# Patient Record
Sex: MALE | Race: WHITE | NOT HISPANIC OR LATINO | URBAN - METROPOLITAN AREA
[De-identification: names, ages, dates, MRNs, and addresses within clinical notes are randomized per-mention and may not be internally consistent; named-entity substitution may affect disease eponyms.]

---

## 2020-03-15 ENCOUNTER — EMERGENCY (EMERGENCY)
Facility: HOSPITAL | Age: 45
LOS: 0 days | Discharge: HOME | End: 2020-03-15
Attending: EMERGENCY MEDICINE | Admitting: EMERGENCY MEDICINE
Payer: COMMERCIAL

## 2020-03-15 VITALS
OXYGEN SATURATION: 96 % | DIASTOLIC BLOOD PRESSURE: 56 MMHG | SYSTOLIC BLOOD PRESSURE: 110 MMHG | HEART RATE: 76 BPM | TEMPERATURE: 100 F | RESPIRATION RATE: 16 BRPM

## 2020-03-15 DIAGNOSIS — J45.901 UNSPECIFIED ASTHMA WITH (ACUTE) EXACERBATION: ICD-10-CM

## 2020-03-15 DIAGNOSIS — R05 COUGH: ICD-10-CM

## 2020-03-15 LAB
FLU A RESULT: NEGATIVE — SIGNIFICANT CHANGE UP
FLU A RESULT: NEGATIVE — SIGNIFICANT CHANGE UP
FLUAV AG NPH QL: NEGATIVE — SIGNIFICANT CHANGE UP
FLUBV AG NPH QL: NEGATIVE — SIGNIFICANT CHANGE UP
RSV RESULT: NEGATIVE — SIGNIFICANT CHANGE UP
RSV RNA RESP QL NAA+PROBE: NEGATIVE — SIGNIFICANT CHANGE UP

## 2020-03-15 PROCEDURE — 71045 X-RAY EXAM CHEST 1 VIEW: CPT | Mod: 26

## 2020-03-15 PROCEDURE — 99284 EMERGENCY DEPT VISIT MOD MDM: CPT

## 2020-03-15 RX ORDER — KETOROLAC TROMETHAMINE 30 MG/ML
30 SYRINGE (ML) INJECTION ONCE
Refills: 0 | Status: DISCONTINUED | OUTPATIENT
Start: 2020-03-15 | End: 2020-03-15

## 2020-03-15 RX ORDER — ALBUTEROL 90 UG/1
1 AEROSOL, METERED ORAL
Refills: 0 | Status: COMPLETED | OUTPATIENT
Start: 2020-03-15 | End: 2020-03-15

## 2020-03-15 RX ADMIN — Medication 50 MILLIGRAM(S): at 17:24

## 2020-03-15 RX ADMIN — Medication 30 MILLIGRAM(S): at 17:26

## 2020-03-15 RX ADMIN — ALBUTEROL 1 PUFF(S): 90 AEROSOL, METERED ORAL at 17:24

## 2020-03-15 RX ADMIN — ALBUTEROL 1 PUFF(S): 90 AEROSOL, METERED ORAL at 17:29

## 2020-03-15 NOTE — ED PROVIDER NOTE - OBJECTIVE STATEMENT
44yoM with recent dx of asthma, GERD, on Symbicort and albuterol, presents for covid concern.  was at a  and was on a line for 1.5 hours with a colleague talking and hugged and shook him, who later tested positive for covid. Reports cough >3 months, worsened x 5 days, white productive sputum. Fever on Thursday, Tmax yesterday 102, last took Tylenol 1230pm today. Started Z pack on Thursday without improvement. Stopped using his Symbicort yesterday and has not attempted albuterol during this acute exacerbation. Associated generalized body aches, SOB states largely 2/2 the coughing episodes. Denies leg pain, swelling, recent long distance travel, and al other symptoms. No FHx of CAD, nonsmoker. Follows closely with pulm Lara Goyal and Rosa.

## 2020-03-15 NOTE — ED ADULT NURSE NOTE - CHIEF COMPLAINT QUOTE
cough, fever and shortness of breath x 4 days, sent in from [facility] [place] for COVID-19 testing . 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

## 2020-03-15 NOTE — ED PROVIDER NOTE - CLINICAL SUMMARY MEDICAL DECISION MAKING FREE TEXT BOX
Character/context low suspicion for ACS or PE. No e/o fluid overload, PNA, or PTX. No e/o infectious s/s's by hx/exam. Character c/w viral syndrome. In light of known extended covid contact, Covid-19 PCR sent. Given alb MDI (which he has not been using), Toradol, prednisone with significant improvement and pt will f/u with his pulmonologist. Patient is well appearing, NAD, afebrile, hemodynamically stable. Discharged with instructions in further symptomatic care, return precautions, and need for PMD and pulm f/u.

## 2020-03-15 NOTE — ED PROVIDER NOTE - PATIENT PORTAL LINK FT
You can access the FollowMyHealth Patient Portal offered by [facility] by registering at the following website: [email or web address]. By joining [facility]’s FollowMyHealth portal, you will also be able to view your health information using other applications (apps) compatible with our system.

## 2020-03-15 NOTE — ED ADULT TRIAGE NOTE - CHIEF COMPLAINT QUOTE
cough, fever and shortness of breath x 4 days, [place] [facility] [place] . 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

## 2020-03-15 NOTE — ED PROVIDER NOTE - PHYSICAL EXAMINATION
Afebrile, hemodynamically stable, saturating well on RA  NAD, well appearing, no WOB, intermittent coughing episodes  Head NCAT  EOMI grossly, anicteric  MMM  RRR, nml S1/S2, no m/r/g  Lungs CTAB, no w/r/r, intermittent wheeze with cough  Abd soft, NT, ND, nml BS, no rebound or guarding  AAO, CN's 3-12 grossly intact  BULLARD spontaneously, no leg cyanosis or edema  Skin warm, well perfused, no rashes or hives

## 2020-03-15 NOTE — ED ADULT NURSE NOTE - OBJECTIVE STATEMENT
Patient presents to ED with c/o fever and cough x five days. patient states he had contact with covid + patient.

## 2020-03-15 NOTE — ED PROVIDER NOTE - NSFOLLOWUPINSTRUCTIONS_ED_ALL_ED_FT
Please follow up with your primary care doctor and pulmonologist in 1-2 days.  Please use the albuterol inhaler every 4 hours for the next 24 hours, and then every 4 hours as needed for cough or shortness of breath - or more frequently as needed.  Please return to the emergency department if you have worsening shortness of breath, chest pain, weakness, or any other symptoms.

## 2020-03-17 LAB — RAPID RVP RESULT: SIGNIFICANT CHANGE UP

## 2020-03-22 LAB — SARS-COV-2 RNA SPEC QL NAA+PROBE: DETECTED

## 2020-03-23 NOTE — ED POST DISCHARGE NOTE - ADDITIONAL DOCUMENTATION
Pt informed of positive COVID 19 lab result.  Pt was already aware of this result.  Pt reports he is feeling better and is doing well.

## 2022-05-29 NOTE — ED ADULT TRIAGE NOTE - INTERNATIONAL TRAVEL
No Pt seen and examined w resident.  I agree with resident's H&P, assessment and plan, except where mine differs.  --MD Dwight

## 2024-03-07 PROBLEM — Z00.00 ENCOUNTER FOR PREVENTIVE HEALTH EXAMINATION: Status: ACTIVE | Noted: 2024-03-07

## 2024-03-12 ENCOUNTER — APPOINTMENT (OUTPATIENT)
Dept: SURGERY | Facility: CLINIC | Age: 49
End: 2024-03-12
Payer: COMMERCIAL

## 2024-03-12 VITALS — HEIGHT: 68 IN | WEIGHT: 178 LBS | BODY MASS INDEX: 26.98 KG/M2

## 2024-03-12 DIAGNOSIS — S76.212A STRAIN OF ADDUCTOR MUSCLE, FASCIA AND TENDON OF LEFT THIGH, INITIAL ENCOUNTER: ICD-10-CM

## 2024-03-12 PROCEDURE — 99203 OFFICE O/P NEW LOW 30 MIN: CPT

## 2024-03-12 NOTE — CONSULT LETTER
[Dear  ___] : Dear  [unfilled], [Courtesy Letter:] : I had the pleasure of seeing your patient, [unfilled], in my office today. [Please see my note below.] : Please see my note below. [Consult Closing:] : Thank you very much for allowing me to participate in the care of this patient.  If you have any questions, please do not hesitate to contact me. [FreeTextEntry3] : Respectfully,  Tyrel Suarez M.D., FACS

## 2024-03-12 NOTE — PHYSICAL EXAM
[Normal Breath Sounds] : Normal breath sounds [JVD] : no jugular venous distention  [Alert] : alert [No Rash or Lesion] : No rash or lesion [Calm] : calm [de-identified] : Normal [de-identified] : Healthy [de-identified] : Normal testicles [de-identified] : Mildly protuberant abdomen [de-identified] : Right inguinal hernia, reducible; no left groin hernia recurrence

## 2024-03-12 NOTE — ASSESSMENT
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  He often does heavy lifting at work and he enjoys exercising and running.  Physical examination demonstrates an egg sized tender bulge in the right groin which is reducible with minimal to moderate difficulty consistent with a symptomatic protruding right inguinal hernia warranting surgical repair.  There is no evidence of incarceration or strangulation, and the patient denies any symptoms of obstruction.  Examination of his left groin demonstrates some mild to moderate weakness but no obvious hernia recurrence.  He does have tenderness to deep palpation in the medial aspect of the inguinal canal consistent with a significant muscle strain.  Both testicles are normal, although he has a pronounced cremasteric reflex on the right (which is likely related to his hernia).  His umbilical examination is unremarkable with no evidence of recurrence.  His current BMI is 27.  With regard to his left groin symptoms, I believe he sustained a significant muscle strain due to overexertion and I recommended alternating ice/heat therapy and nonsteroidal anti-inflammatory medication for the next few weeks, and avoiding strenuous physical activity whenever possible during that time frame.  He is aware that the symptoms can last for many months and sometimes up to 1 year.  He was encouraged to return to me in the future if these symptoms persist or worsen significantly, of course.  With regard to his right inguinal hernia, I explained the pros and cons of surgery, as well as all risks, benefits, indications and alternatives of the procedure and the patient understood and agreed.  He would like this done as soon as possible as he has a trip scheduled for late April to celebrate his 49th birthday.  Tyrel was scheduled for the repair of his right inguinal hernia with mesh on Wednesday, April 3, 2024 under LOCAL with IV SEDATION at [facility] with presurgical testing waived.  He was encouraged to avoid heavy lifting and strenuous activity in the interim, of course.
no

## 2024-04-02 NOTE — ASU PATIENT PROFILE, ADULT - NSTRANFUSIONOBJECTION_GEN_ALL_CORE_SIUH
Wife aware, said he is short of breath last couple days as well, asking for a chest xray to be ordered too  Patient has no objection to blood transfusions.

## 2024-04-02 NOTE — ASU PATIENT PROFILE, ADULT - FALL HARM RISK - UNIVERSAL INTERVENTIONS
Bed in lowest position, wheels locked, appropriate side rails in place/Call bell, personal items and telephone in reach/Instruct patient to call for assistance before getting out of bed or chair/Non-slip footwear when patient is out of bed/[place] to call system/Physically safe environment - no spills, clutter or unnecessary equipment/Purposeful Proactive Rounding/Room/bathroom lighting operational, light cord in reach

## 2024-04-02 NOTE — ASU PATIENT PROFILE, ADULT - FALL HARM RISK - FACTORS NURSING JUDGEMENT
Vaccine Information Statement(s) was given today. This has been reviewed, questions answered, and verbal consent given by Parent for injection(s) and administration of Influenza (Inactivated).    1. Does the patient have a moderate to severe fever?  No  2. Has the patient had a serious reaction to a flu shot before?   No  3. Has the patient ever had Guillian Durand Syndrome within 6 weeks of a previous flu shot?  No  4. Does the patient have a serious allergy to eggs?  No  5. Is the patient less that 6 months of age?  No    Patient is eligible to receive the vaccine based on all questions being answered as 'No'.        Patient tolerated without incident. See immunization grid for documentation.     No

## 2024-04-03 ENCOUNTER — TRANSCRIPTION ENCOUNTER (OUTPATIENT)
Age: 49
End: 2024-04-03

## 2024-04-03 ENCOUNTER — OUTPATIENT (OUTPATIENT)
Dept: OUTPATIENT SERVICES | Facility: HOSPITAL | Age: 49
LOS: 1 days | Discharge: HOME | End: 2024-04-03

## 2024-04-03 ENCOUNTER — APPOINTMENT (OUTPATIENT)
Dept: SURGERY | Facility: AMBULATORY SURGERY CENTER | Age: 49
End: 2024-04-03
Payer: COMMERCIAL

## 2024-04-03 VITALS
DIASTOLIC BLOOD PRESSURE: 81 MMHG | TEMPERATURE: 98 F | OXYGEN SATURATION: 99 % | HEIGHT: 68 IN | WEIGHT: 315 LBS | HEART RATE: 64 BPM | RESPIRATION RATE: 18 BRPM | SYSTOLIC BLOOD PRESSURE: 117 MMHG

## 2024-04-03 VITALS
OXYGEN SATURATION: 98 % | HEART RATE: 66 BPM | RESPIRATION RATE: 15 BRPM | SYSTOLIC BLOOD PRESSURE: 99 MMHG | DIASTOLIC BLOOD PRESSURE: 70 MMHG

## 2024-04-03 DIAGNOSIS — K40.90 UNILATERAL INGUINAL HERNIA, WITHOUT OBSTRUCTION OR GANGRENE, NOT SPECIFIED AS RECURRENT: ICD-10-CM

## 2024-04-03 DIAGNOSIS — Z98.890 OTHER SPECIFIED POSTPROCEDURAL STATES: Chronic | ICD-10-CM

## 2024-04-03 PROCEDURE — 49505 PRP I/HERN INIT REDUC >5 YR: CPT | Mod: RT

## 2024-04-03 PROCEDURE — C1781: CPT

## 2024-04-03 RX ORDER — OXYCODONE HYDROCHLORIDE 5 MG/1
5 TABLET ORAL ONCE
Refills: 0 | Status: DISCONTINUED | OUTPATIENT
Start: 2024-04-03 | End: 2024-04-03

## 2024-04-03 RX ORDER — ONDANSETRON 8 MG/1
4 TABLET, FILM COATED ORAL ONCE
Refills: 0 | Status: DISCONTINUED | OUTPATIENT
Start: 2024-04-03 | End: 2024-04-03

## 2024-04-03 RX ORDER — ACETAMINOPHEN 500 MG
650 TABLET ORAL ONCE
Refills: 0 | Status: DISCONTINUED | OUTPATIENT
Start: 2024-04-03 | End: 2024-04-03

## 2024-04-03 RX ORDER — HYDROMORPHONE HYDROCHLORIDE 2 MG/ML
0.5 INJECTION INTRAMUSCULAR; INTRAVENOUS; SUBCUTANEOUS
Refills: 0 | Status: DISCONTINUED | OUTPATIENT
Start: 2024-04-03 | End: 2024-04-03

## 2024-04-03 RX ORDER — SODIUM CHLORIDE 9 MG/ML
1000 INJECTION, SOLUTION INTRAVENOUS
Refills: 0 | Status: DISCONTINUED | OUTPATIENT
Start: 2024-04-03 | End: 2024-04-03

## 2024-04-03 RX ADMIN — SODIUM CHLORIDE 100 MILLILITER(S): 9 INJECTION, SOLUTION INTRAVENOUS at 10:48

## 2024-04-03 NOTE — ASU DISCHARGE PLAN (ADULT/PEDIATRIC) - NS MD DC FALL RISK RISK
For information on Fall & Injury Prevention, visit: [email or web address] OR  [email or web address] OR  https://www.cdc.gov/steadi/patient.html

## 2024-04-03 NOTE — ASU DISCHARGE PLAN (ADULT/PEDIATRIC) - ASU DC SPECIAL INSTRUCTIONSFT
Diet    Eat light on the day of surgery. Nausea and vomiting can occur after anesthesia,   but usually resolve within 24 hours.  Resume normal diet the following day.      Activity    Rest!  No heavy lifting or strenuous activity.    Medications    Ibuprofen (Advil, Motrin), Naproxen (Aleve) and/or Extra-Strength Tylenol for pain.  Tramadol for severe pain only.  Your prescription was sent electronically to your pharmacy.  Remember, Tramadol is a strong narcotic pain reliever which can cause drowsiness, upset stomach and constipation.  It should always be taken with food.  You can use stool softener (Mineral Oil) or laxative (MiraLax or Dulcolax) if constipated.  An antibiotic is given during surgery.  No antibiotic needed at home.  Resume all previous medications.  Resume blood thinners the day after surgery unless told otherwise.    Wound Care    Leave surgical dressing in place.  May shower (dressing is waterproof.)  No pool, ocean, lake, hot-tub or bath for 3 weeks. If you were given an abdominal binder, please wear it as much as possible (day & night) for 2 weeks, but you must remove it for showers.  Ice packs to the area intermittently for several days help with pain and swelling.  Bruising (“black and blue”) is common.  Treatment is…Ice & Rest.       - You will see [facility] Physician Assistant, Nubia Caballero, at your postoperative visit noted below.

## 2024-04-03 NOTE — PRE-ANESTHESIA EVALUATION ADULT - NSANTHOSAYNRD_GEN_A_CORE
No. SANTINO screening performed.  STOP BANG Legend: 0-2 = LOW Risk; 3-4 = INTERMEDIATE Risk; 5-8 = HIGH Risk

## 2024-04-03 NOTE — ASU DISCHARGE PLAN (ADULT/PEDIATRIC) - CARE PROVIDER_API CALL
Tyrel Suarez  Surgery  [address] [place] [id number]  Phone: [phone]  Fax: [phone]  Scheduled Appointment: 04/10/2024 08:40 AM

## 2024-04-03 NOTE — ASU DISCHARGE PLAN (ADULT/PEDIATRIC) - COMMENTS
- You will see [facility] Physician Assistant, Nubia Caballero, at your postoperative visit noted above.

## 2024-04-05 DIAGNOSIS — K40.90 UNILATERAL INGUINAL HERNIA, WITHOUT OBSTRUCTION OR GANGRENE, NOT SPECIFIED AS RECURRENT: ICD-10-CM

## 2024-04-08 ENCOUNTER — NON-APPOINTMENT (OUTPATIENT)
Age: 49
End: 2024-04-08

## 2024-04-10 ENCOUNTER — APPOINTMENT (OUTPATIENT)
Dept: SURGERY | Facility: CLINIC | Age: 49
End: 2024-04-10
Payer: COMMERCIAL

## 2024-04-10 DIAGNOSIS — K40.90 UNILATERAL INGUINAL HERNIA, W/OUT OBSTRUCTION OR GANGRENE, NOT SPECIFIED AS RECURRENT: ICD-10-CM

## 2024-04-10 PROCEDURE — 99024 POSTOP FOLLOW-UP VISIT: CPT

## 2024-04-10 RX ORDER — TRAMADOL HYDROCHLORIDE 50 MG/1
50 TABLET, COATED ORAL
Qty: 20 | Refills: 0 | Status: COMPLETED | COMMUNITY
Start: 2024-04-02 | End: 2024-04-10

## 2024-04-10 NOTE — ASSESSMENT
[FreeTextEntry1] : HETAL DIETZ underwent an moderate to large indirect and a large direct (pantaloon) right inguinal hernia repair with mesh with Dr. Suarez on 4/3/24 under local IV sedation without any problems or complications. His wound is clean, dry and intact. There is no evidence of erythema, seroma formation or infection. He is tolerating a diet and having normal bowel movements. He denies any significant postoperative pain or discomfort at this time.   He was counseled and reassured. HETAL was discharged from the office with no specific follow up necessary, but he knows to avoid any heavy lifting or strenuous activity for the next several weeks.

## 2024-04-10 NOTE — CONSULT LETTER
[Dear  ___] : Dear  [unfilled], [Courtesy Letter:] : I had the pleasure of seeing your patient, [unfilled], in my office today. [Please see my note below.] : Please see my note below. [Consult Closing:] : Thank you very much for allowing me to participate in the care of this patient.  If you have any questions, please do not hesitate to contact me. [Sincerely,] : Sincerely, [FreeTextEntry3] :     Jacqueline Caballero PA-C, MSPAS
